# Patient Record
Sex: MALE | Race: WHITE | Employment: FULL TIME | URBAN - METROPOLITAN AREA
[De-identification: names, ages, dates, MRNs, and addresses within clinical notes are randomized per-mention and may not be internally consistent; named-entity substitution may affect disease eponyms.]

---

## 2021-04-01 ENCOUNTER — IMMUNIZATION (OUTPATIENT)
Dept: FAMILY MEDICINE | Facility: CLINIC | Age: 61
End: 2021-04-01
Payer: COMMERCIAL

## 2021-04-01 PROCEDURE — 0011A PR COVID VAC MODERNA 100 MCG/0.5 ML IM: CPT

## 2021-04-01 PROCEDURE — 91301 PR COVID VAC MODERNA 100 MCG/0.5 ML IM: CPT

## 2021-04-29 ENCOUNTER — IMMUNIZATION (OUTPATIENT)
Dept: FAMILY MEDICINE | Facility: CLINIC | Age: 61
End: 2021-04-29
Attending: FAMILY MEDICINE
Payer: COMMERCIAL

## 2021-04-29 PROCEDURE — 91301 PR COVID VAC MODERNA 100 MCG/0.5 ML IM: CPT

## 2021-04-29 PROCEDURE — 0012A PR COVID VAC MODERNA 100 MCG/0.5 ML IM: CPT

## 2021-11-16 ENCOUNTER — HOSPITAL ENCOUNTER (EMERGENCY)
Facility: CLINIC | Age: 61
Discharge: HOME | End: 2021-11-16
Attending: EMERGENCY MEDICINE | Admitting: EMERGENCY MEDICINE
Payer: OTHER MISCELLANEOUS

## 2021-11-16 ENCOUNTER — APPOINTMENT (OUTPATIENT)
Dept: GENERAL RADIOLOGY | Facility: CLINIC | Age: 61
End: 2021-11-16
Attending: EMERGENCY MEDICINE
Payer: OTHER MISCELLANEOUS

## 2021-11-16 VITALS
TEMPERATURE: 97.8 F | DIASTOLIC BLOOD PRESSURE: 88 MMHG | SYSTOLIC BLOOD PRESSURE: 160 MMHG | HEART RATE: 99 BPM | OXYGEN SATURATION: 96 % | RESPIRATION RATE: 16 BRPM

## 2021-11-16 DIAGNOSIS — R93.89 ABNORMAL X-RAY: ICD-10-CM

## 2021-11-16 DIAGNOSIS — S61.217A LACERATION OF LEFT LITTLE FINGER WITHOUT FOREIGN BODY WITHOUT DAMAGE TO NAIL, INITIAL ENCOUNTER: ICD-10-CM

## 2021-11-16 PROCEDURE — 12002 RPR S/N/AX/GEN/TRNK2.6-7.5CM: CPT

## 2021-11-16 PROCEDURE — 73130 X-RAY EXAM OF HAND: CPT | Mod: LT

## 2021-11-16 PROCEDURE — 99283 EMERGENCY DEPT VISIT LOW MDM: CPT

## 2021-11-16 NOTE — ED NOTES
RN entered room to give tetanus shot and discharge paperwork. Patient was gone from room. Waited 10 mins and patient did not return. Discharged from computer.

## 2021-11-16 NOTE — DISCHARGE INSTRUCTIONS
As we discussed, please keep that wound dry for the next 48 hours, and then change the bandage over it every day thereafter.  Do see your regular doctor in 7 to 10 days for suture removal and a wound check.  Come back if your finger gets red, more swollen, or appears infected to you.  Your x-ray shows possible degenerative joint disease in the finger, please be sure to get this looked at with your regular doctor as well.  Back with any concerns you have.

## 2021-11-16 NOTE — ED PROVIDER NOTES
History     Chief Complaint:  No chief complaint on file.       HPI   Todd Montemayor is a 60 year old male who presents with a laceration to the finger pad of his left finger.  He is right-hand dominant, works as a mechanics worker, and caught the left pinky on a spring-loaded real that is used to house a car hose.  He is quite clear that the hose itself did not do any damage, nor was it on.  He does not believe that there are any foreign bodies in his finger.  He has no numbness or tingling, bleeding is controlled at the site and he did wash it out right away when the injury took place.    Allergies:  Allergies have not been reviewed     Medications:    No current outpatient medications on file.      Past Medical History:    No past medical history on file.    There are no problems to display for this patient.       Past Surgical History:    No past surgical history on file.     Family History:    family history is not on file.    Social History:       PCP: No primary care provider on file.     Review of Systems   All other systems reviewed and are negative.        Physical Exam   Patient Vitals for the past 24 hrs:   BP Temp Pulse Resp SpO2   11/16/21 0731 -- -- -- -- 96 %   11/16/21 0730 (!) 160/88 97.8  F (36.6  C) 99 16 --        Physical Exam  Vitals: reviewed by me  General: Pt seen on [facility], cooperative, and alert to conversation  Eyes: Tracking well, clear conjunctiva BL  ENT: MMM, midline trachea.   Lungs: No tachypnea, no accessory muscle use. No respiratory distress.   CV: Rate as above  MSK: no joint effusion.  Left hand with sensation intact light touch to first dorsal webspace, index finger and pinky finger.  Can A-OK, thumbs up, 2 3 cross.  Pinky finger has a 3 cm laceration that is L-shaped over the volar finger pad.  The ulnar and radial digital nerves are intact.  He can flex at the MCP and the DIP in isolation.  Distal finger appears well perfused.  Skin: No rash  Neuro:  Clear speech and no facial droop.  Psych: Not RIS, no e/o AH/VH    Emergency Department Course     Imaging:  XR Hand Left G/E 3 Views   Final Result   IMPRESSION: No evidence of acute fracture or dislocation. Nonspecific   thinly corticated cysts are noted in the distal pole of the scaphoid   and distal head of the long finger middle phalanx. 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      KAT APARICIO MD            SYSTEM ID:  [id number]           Laboratory:  Labs Ordered and Resulted from Time of ED Arrival to Time of ED Departure - No data to display     Procedures:    Narrative: Procedure: Laceration repair       INDICATION: Laceration      LOCATION: Left pinky finger pad      CONSENT: The patient was verbally consented regarding the risks, benefits of the procedure.      ANESTHESIA: Local using 3 cc of 1% lighter without        TECHNIQUE: After soaking in a water bath with Hibiclens, A 3.5 cm laceration was repaired with 7 simple interrupted sutures, using 4-0 Ethilon.  This was mildly complex, and required some debridement. The patient tolerated the procedure well without difficulty.  There were no complications.     Emergency Department Course:  Past medical records, nursing notes, and vitals reviewed.  I performed an exam of the patient and obtained history, as documented above.      Impression & Plan      Medical Decision Making:  This is a pleasant 60-year-old male presents to emergency room with appears to be a finger laceration.  Please see laceration repair note as above, he tolerated the procedure very well.  X-ray shows some degenerative change, but no foreign body, consistent with his mechanism.  Will recommend Tylenol Motrin for pain, and have him follow with his regular doctor in the next 1 week for suture removal and wound recheck.  Red flags for when to come back to the ER were discussed in detail.  Tetanus was ordered.      Diagnosis:    ICD-10-CM    1.  Laceration of left little finger without foreign body without damage to nail, initial encounter  S61.217A    2. Abnormal x-ray  R93.89         Discharge Medications:  New Prescriptions    No medications on file        11/16/2021   Jay Oshea*        Jay Oshea MD  11/16/21 [id number]       Jay Oshea MD  11/16/21 111